# Patient Record
Sex: FEMALE | Race: BLACK OR AFRICAN AMERICAN | Employment: FULL TIME | URBAN - METROPOLITAN AREA
[De-identification: names, ages, dates, MRNs, and addresses within clinical notes are randomized per-mention and may not be internally consistent; named-entity substitution may affect disease eponyms.]

---

## 2022-05-01 ENCOUNTER — APPOINTMENT (OUTPATIENT)
Dept: GENERAL RADIOLOGY | Age: 47
End: 2022-05-01
Payer: COMMERCIAL

## 2022-05-01 ENCOUNTER — HOSPITAL ENCOUNTER (EMERGENCY)
Age: 47
Discharge: HOME | End: 2022-05-01
Attending: STUDENT IN AN ORGANIZED HEALTH CARE EDUCATION/TRAINING PROGRAM
Payer: COMMERCIAL

## 2022-05-01 VITALS
RESPIRATION RATE: 20 BRPM | BODY MASS INDEX: 32.27 KG/M2 | OXYGEN SATURATION: 95 % | SYSTOLIC BLOOD PRESSURE: 110 MMHG | HEIGHT: 64 IN | WEIGHT: 189 LBS | DIASTOLIC BLOOD PRESSURE: 76 MMHG | HEART RATE: 105 BPM | TEMPERATURE: 98.2 F

## 2022-05-01 DIAGNOSIS — S99.912A INJURY OF LEFT ANKLE, INITIAL ENCOUNTER: Primary | ICD-10-CM

## 2022-05-01 PROCEDURE — 73630 X-RAY EXAM OF FOOT: CPT

## 2022-05-01 PROCEDURE — 6360000002 HC RX W HCPCS: Performed by: STUDENT IN AN ORGANIZED HEALTH CARE EDUCATION/TRAINING PROGRAM

## 2022-05-01 PROCEDURE — 99284 EMERGENCY DEPT VISIT MOD MDM: CPT

## 2022-05-01 PROCEDURE — 73610 X-RAY EXAM OF ANKLE: CPT

## 2022-05-01 PROCEDURE — 96374 THER/PROPH/DIAG INJ IV PUSH: CPT

## 2022-05-01 PROCEDURE — 96375 TX/PRO/DX INJ NEW DRUG ADDON: CPT

## 2022-05-01 RX ORDER — ONDANSETRON 2 MG/ML
4 INJECTION INTRAMUSCULAR; INTRAVENOUS ONCE
Status: COMPLETED | OUTPATIENT
Start: 2022-05-01 | End: 2022-05-01

## 2022-05-01 RX ADMIN — ONDANSETRON 4 MG: 2 INJECTION INTRAMUSCULAR; INTRAVENOUS at 13:05

## 2022-05-01 RX ADMIN — HYDROMORPHONE HYDROCHLORIDE 0.5 MG: 1 INJECTION, SOLUTION INTRAMUSCULAR; INTRAVENOUS; SUBCUTANEOUS at 13:03

## 2022-05-01 ASSESSMENT — PAIN DESCRIPTION - LOCATION: LOCATION: ANKLE

## 2022-05-01 ASSESSMENT — PAIN DESCRIPTION - ONSET: ONSET: SUDDEN

## 2022-05-01 ASSESSMENT — PAIN DESCRIPTION - ORIENTATION: ORIENTATION: LEFT

## 2022-05-01 ASSESSMENT — ENCOUNTER SYMPTOMS
SHORTNESS OF BREATH: 0
SORE THROAT: 0

## 2022-05-01 ASSESSMENT — PAIN DESCRIPTION - FREQUENCY: FREQUENCY: CONTINUOUS

## 2022-05-01 ASSESSMENT — PAIN SCALES - GENERAL
PAINLEVEL_OUTOF10: 8
PAINLEVEL_OUTOF10: 8

## 2022-05-01 ASSESSMENT — PAIN DESCRIPTION - PAIN TYPE: TYPE: ACUTE PAIN

## 2022-05-01 ASSESSMENT — PAIN - FUNCTIONAL ASSESSMENT: PAIN_FUNCTIONAL_ASSESSMENT: 0-10

## 2022-05-01 NOTE — ED PROVIDER NOTES
[address] [facility]          ATTENDING PHYSICIAN NOTE       Date of evaluation: 5/1/2022    Chief Complaint     Ankle Injury (left ankle injury after fall at home. states she lost her balance from a history of neuropathy. may have hyperextended her knee and states her ankle went under her and she felt it crack. states she has been falling due to the neuropathy. Did not hit head or have LOC. )      History of Present Illness     Wellington Lucero is a 52 y.o. female who presents who presents with left ankle injury. States that earlier this morning she was in the bathroom when she lost her balance and ankle went under her causing her to fall. States that she felt a crack. She has been unable to walk since accident. Denies any head trauma or loss of consciousness. Denies prior injury to her left ankle. She was given pain medicine in route by EMS with minimal improvement. Currently rates pain 8/10. Patient has history of neuropathy. Denies any worsening numbness or tingling. Review of Systems     Review of Systems   Constitutional: Negative for fever. HENT: Negative for congestion and sore throat. Eyes: Negative for visual disturbance. Respiratory: Negative for shortness of breath. Cardiovascular: Negative for chest pain. Musculoskeletal: Positive for arthralgias, gait problem and joint swelling (left ankle). Neurological: Negative for dizziness and syncope. Past Medical, Surgical, Family, and Social History     She has no past medical history on file. She has no past surgical history on file. Her family history is not on file. She reports that she has never smoked. She has never used smokeless tobacco. She reports current alcohol use. She reports current drug use. Drug: Marijuana Garon Kettle). Medications     Previous Medications    No medications on file       Allergies     She is allergic to latex and compazine [prochlorperazine].     Physical Exam INITIAL VITALS: BP: 110/76, Temp: 98.2 °F (36.8 °C), Pulse: 105, Resp: 20, SpO2: 95 %   Physical Exam  Constitutional:       Appearance: Normal appearance. Cardiovascular:      Rate and Rhythm: Normal rate. Heart sounds: Normal heart sounds. Pulmonary:      Effort: Pulmonary effort is normal.   Musculoskeletal:         General: Swelling (left ankle) and tenderness (diffuse ) present. Cervical back: Neck supple. Comments: Pain with plantarflexion and dorsiflexion. TTP along dorsum of foot with soft tissue swelling. No knee tenderness or swelling, intact flexion and extension at knee   Neurological:      General: No focal deficit present. Mental Status: She is alert. Sensory: No sensory deficit. Psychiatric:         Mood and Affect: Mood normal.         Diagnostic Results     EKG   NA    RADIOLOGY:  XR FOOT LEFT (MIN 3 VIEWS)   Final Result      1. No acute abnormality. XR ANKLE LEFT (MIN 3 VIEWS)   Final Result      1. Soft tissue swelling. LABS:   No results found for this visit on 05/01/22. ED BEDSIDE ULTRASOUND:  NA    RECENT VITALS:  BP: 110/76,Temp: 98.2 °F (36.8 °C), Pulse: 105, Resp: 20, SpO2: 95 %     Procedures     NA    ED Course     Nursing Notes, Past Medical Hx, Past Surgical Hx, Social Hx,Allergies, and Family Hx were reviewed. patient was given the following medications:  Orders Placed This Encounter   Medications    DISCONTD: HYDROmorphone (DILAUDID) injection 1 mg    ondansetron (ZOFRAN) injection 4 mg    HYDROmorphone (DILAUDID) injection 0.5 mg       CONSULTS:  None    MEDICAL DECISIONMAKING / ASSESSMENT / PLAN     Penny Sauceda is a 52 y.o. female who presents with left ankle injury. She presented normotensive, afebrile, mildly tachycardic with a heart rate of 105, normal respiratory rate of 20, oxygen saturation of 95% on room air. On exam she did have diffuse tenderness to her left ankle as well as swelling but no obvious deformity. Given her history and exam I am concerned for distal tibia or fibular fracture, malleoli are fracture, dislocation. Doubt underlying neurovascular injury given that she has normal pulses and sensation intact. We obtained x-ray of her left ankle and foot. Interpreted the imaging and note:  X-ray left ankle: Soft tissue swelling but no acute fracture or dislocation. Ankle mortise is not widened  X-ray left foot: No acute fracture. Assessment: Given work-up no acute fracture dislocation from fall. She likely has ligamentous sprain. We placed patient in a low tide boot (high tide not available) and will have her follow-up with Ortho sports medicine. All questions and concerns were addressed. Strict return precautions discussed. Clinical Impression     1.  Injury of left ankle, initial encounter        Disposition     PATIENT REFERRED TO:   Dmruddy Moss Lynda  [address] [place] [id number]  [phone]    Call         DISCHARGE MEDICATIONS:  New Prescriptions    No medications on file       DISPOSITION Decision To Discharge 05/01/2022 01:39:25 PM          Naomi Paulino MD  05/01/22 [id number]

## 2022-05-01 NOTE — ED NOTES
Patient discharged to home with family. Patient verbalized understanding of discharge instructions. Advised of when to return to ED and when to call 911. Advised of follow up with PCP and Sports Medicine. Patient received 1 Rx with education. Patient verbalized understanding of education. No questions from patient to this RN. Patient left ED without incident.      Francisco Velazquez RN  05/01/22 [id number]